# Patient Record
Sex: MALE | Race: WHITE | NOT HISPANIC OR LATINO | Employment: OTHER | URBAN - METROPOLITAN AREA
[De-identification: names, ages, dates, MRNs, and addresses within clinical notes are randomized per-mention and may not be internally consistent; named-entity substitution may affect disease eponyms.]

---

## 2020-10-23 ENCOUNTER — TRANSCRIBE ORDERS (OUTPATIENT)
Dept: ADMINISTRATIVE | Facility: HOSPITAL | Age: 75
End: 2020-10-23

## 2020-10-23 DIAGNOSIS — R07.9 CHEST PAIN, UNSPECIFIED TYPE: Primary | ICD-10-CM

## 2020-10-23 DIAGNOSIS — R00.2 PALPITATIONS: ICD-10-CM

## 2021-02-05 ENCOUNTER — HOSPITAL ENCOUNTER (OUTPATIENT)
Dept: NON INVASIVE DIAGNOSTICS | Facility: HOSPITAL | Age: 76
Discharge: HOME | End: 2021-02-05
Payer: MEDICARE

## 2021-02-05 DIAGNOSIS — R07.9 CHEST PAIN, UNSPECIFIED TYPE: ICD-10-CM

## 2021-02-05 DIAGNOSIS — R00.2 PALPITATIONS: ICD-10-CM

## 2021-02-05 PROCEDURE — 93306 TTE W/DOPPLER COMPLETE: CPT | Performed by: INTERNAL MEDICINE

## 2021-02-05 PROCEDURE — 93306 TTE W/DOPPLER COMPLETE: CPT

## 2022-03-09 ENCOUNTER — HOSPITAL ENCOUNTER (OUTPATIENT)
Dept: NUCLEAR MEDICINE | Facility: HOSPITAL | Age: 77
Discharge: HOME | End: 2022-03-09
Payer: MEDICARE

## 2022-03-09 DIAGNOSIS — E21.3 HYPERPARATHYROIDISM, UNSPECIFIED (HCC): ICD-10-CM

## 2022-03-09 PROCEDURE — 78071 PARATHYRD PLANAR W/WO SUBTRJ: CPT

## 2022-03-09 PROCEDURE — A9500 TC99M SESTAMIBI: HCPCS

## 2022-03-09 PROCEDURE — G1004 CDSM NDSC: HCPCS

## 2024-04-11 ENCOUNTER — TELEPHONE (OUTPATIENT)
Age: 79
End: 2024-04-11

## 2024-04-11 ENCOUNTER — OFFICE VISIT (OUTPATIENT)
Dept: UROLOGY | Facility: CLINIC | Age: 79
End: 2024-04-11
Payer: MEDICARE

## 2024-04-11 ENCOUNTER — TELEPHONE (OUTPATIENT)
Dept: UROLOGY | Facility: CLINIC | Age: 79
End: 2024-04-11

## 2024-04-11 VITALS
SYSTOLIC BLOOD PRESSURE: 130 MMHG | HEART RATE: 78 BPM | DIASTOLIC BLOOD PRESSURE: 66 MMHG | OXYGEN SATURATION: 98 % | WEIGHT: 230 LBS | HEIGHT: 72 IN | TEMPERATURE: 98 F | BODY MASS INDEX: 31.15 KG/M2

## 2024-04-11 DIAGNOSIS — N40.1 BENIGN PROSTATIC HYPERPLASIA WITH NOCTURIA: Primary | ICD-10-CM

## 2024-04-11 DIAGNOSIS — N52.01 ERECTILE DYSFUNCTION DUE TO ARTERIAL INSUFFICIENCY: ICD-10-CM

## 2024-04-11 DIAGNOSIS — N18.31 STAGE 3A CHRONIC KIDNEY DISEASE (HCC): ICD-10-CM

## 2024-04-11 DIAGNOSIS — R35.1 BENIGN PROSTATIC HYPERPLASIA WITH NOCTURIA: Primary | ICD-10-CM

## 2024-04-11 PROCEDURE — 81003 URINALYSIS AUTO W/O SCOPE: CPT | Performed by: UROLOGY

## 2024-04-11 PROCEDURE — 99204 OFFICE O/P NEW MOD 45 MIN: CPT | Performed by: UROLOGY

## 2024-04-11 RX ORDER — OXYCODONE HYDROCHLORIDE 5 MG/1
TABLET ORAL
COMMUNITY
Start: 2024-01-30

## 2024-04-11 RX ORDER — SILDENAFIL CITRATE 20 MG/1
TABLET ORAL
Qty: 90 TABLET | Refills: 3 | Status: SHIPPED | OUTPATIENT
Start: 2024-04-11

## 2024-04-11 NOTE — TELEPHONE ENCOUNTER
Call to Dr. Jacobsen's office. Last psa was 5/2023 and was 0.01. All records were scanned into [employer]

## 2024-04-11 NOTE — TELEPHONE ENCOUNTER
PA for REVATIO    Submitted via    [x]CMM-KEY [id number]    []Surescripts-Case ID #   []Faxed to plan   []Other website   []Phone call Case ID #     Office notes sent, clinical questions answered. Awaiting determination    Turnaround time for your insurance to make a decision on your Prior Authorization can take 7-21 business days.

## 2024-04-11 NOTE — PROGRESS NOTES
UROLOGY PROGRESS NOTE         NAME: Demarcus Nelson  AGE: 79 y.o. SEX: male  : 1945   MRN: [id number]    DATE: 2024  TIME: 1:33 PM    Assessment and Plan      Impression:   1. Benign prostatic hyperplasia with nocturia    2. Erectile dysfunction due to arterial insufficiency    Mild BPH symptoms.  Mild erectile dysfunction.  Patient is doing fairly well symptomatically.  Viagra has not helped him in the past.  Options discussed.     Plan: Check PSA.  Restart sildenafil.  Follow-up 1 year or sooner if any troubles.      Chief Complaint     Chief Complaint   Patient presents with    Establishing new urology doctor-needs yearly check up     History of Present Illness     HPI: Demarcus Nelson is a 79 y.o. year old male who presents with history of mild BPH followed over the years.  PSA last year 0.1.  He has nocturia once a night occasional urgency.  Otherwise is relatively asymptomatic from urologic standpoint.  His symptoms are not bothersome.  No hematuria no dysuria.  Previously had some erectile dysfunction that was treated with sildenafil successfully.  He would like to restart it.  Currently he is able to get an erection that goes soft prior to completing the act.  He tolerated the medicine well in the past.              The following portions of the patient's history were reviewed and updated as appropriate: allergies, current medications, past family history, past medical history, past social history, past surgical history and problem list.  Past Medical History:   Diagnosis Date    Arthritis     BPH (benign prostatic hyperplasia)     Cervical radiculopathy     CKD (chronic kidney disease), stage III (HCC)     Diverticulitis of colon     GERD (gastroesophageal reflux disease)     Heart murmur     HTN (hypertension)     Hypercalcemia     Hyperlipidemia     Hyperparathyroidism (HCC)     Hypothyroidism     Lumbar canal stenosis     PONV (postoperative nausea and vomiting)     Spinal stenosis      TIA (transient ischemic attack)     Vertebral artery stenosis      Past Surgical History:   Procedure Laterality Date    BACK SURGERY      BAND HEMORRHOIDECTOMY      CARPAL TUNNEL RELEASE      CATARACT EXTRACTION      CHOLECYSTECTOMY      COLONOSCOPY      ESOPHAGOGASTRODUODENOSCOPY      HERNIA REPAIR      LUMBAR SPINE SURGERY      PARATHYROIDECTOMY      TOTAL KNEE ARTHROPLASTY      US GUIDED THYROID BIOPSY  02/15/2023     shoulder  Review of Systems     Const: Denies chills, fever and weight loss.  CV: Denies chest pain.  Resp: Denies SOB.  GI: Denies abdominal pain, nausea and vomiting.  : Denies symptoms other than stated above.  Musculo: Denies back pain.    Objective   /66   Pulse 78   Temp 98 °F (36.7 °C)   Ht 6' (1.829 m)   Wt 104 kg (230 lb)   SpO2 98%   BMI 31.19 kg/m²     Physical Exam  Const: Appears healthy and well developed. No signs of acute distress present.  Resp: Respirations are regular and unlabored.   CV: Rate is regular. Rhythm is regular.  Abdomen: Abdomen is soft, nontender, and nondistended. Kidneys are not palpable.  : Penis testicles epididymis normal.  No hernias.  Prostate 1+ benign.  Psych: Patient's attitude is cooperative. Mood is normal. Affect is normal.    Current Medications     Current Outpatient Medications:     amLODIPine (NORVASC) 10 mg tablet, Take 10 mg by mouth daily, Disp: , Rfl:     ASCORBIC ACID CR PO, Take 1 tablet by mouth in the morning, Disp: , Rfl:     aspirin (ECOTRIN LOW STRENGTH) 81 mg EC tablet, Take 81 mg by mouth daily, Disp: , Rfl:     atorvastatin (LIPITOR) 10 mg tablet, Take 10 mg by mouth daily, Disp: , Rfl:     benazepril (LOTENSIN) 40 MG tablet, Take 40 mg by mouth daily, Disp: , Rfl:     Cholecalciferol 125 MCG (5000 UT) TABS, Take 1 tablet by mouth in the morning, Disp: , Rfl:     CYANOCOBALAMIN PO, Take 1 tablet by mouth in the morning, Disp: , Rfl:     DOCOSAHEXAENOIC ACID PO, Take 1 tablet by mouth in the morning, Disp: , Rfl:      DOCOSAHEXAENOIC ACID-EPA PO, Take 1 tablet by mouth in the morning, Disp: , Rfl:     dorzolamide-timolol (COSOPT) 2-0.5 % ophthalmic solution, Administer 1 drop to both eyes 2 (two) times a day, Disp: , Rfl:     hydroCHLOROthiazide 12.5 mg tablet, Take 12.5 mg by mouth daily, Disp: , Rfl:     indapamide (LOZOL) 2.5 mg tablet, Take 2.5 mg by mouth daily, Disp: , Rfl:     latanoprost (XALATAN) 0.005 % ophthalmic solution, Administer 1 drop to both eyes daily, Disp: , Rfl:     levothyroxine 50 mcg tablet, Take 50 mcg by mouth daily, Disp: , Rfl:     lidocaine (XYLOCAINE) 5 % ointment, Apply 1 Application topically once, Disp: , Rfl:     metoprolol succinate (TOPROL-XL) 25 mg 24 hr tablet, Take 25 mg by mouth in the morning, Disp: , Rfl:     Multiple Vitamin (MULTIVITAMIN ADULT PO), Take 1 tablet by mouth daily, Disp: , Rfl:     naproxen (NAPROSYN) 500 mg tablet, Take 500 mg by mouth 2 (two) times a day with meals, Disp: , Rfl:     NIFEdipine ER (ADALAT CC) 30 MG 24 hr tablet, Take 30 mg by mouth daily, Disp: , Rfl:     omeprazole (PriLOSEC) 40 MG capsule, Take 40 mg by mouth 2 (two) times a day, Disp: , Rfl:     Mnyjsl-VrVso-YhEqoq-FA-Omega (MULTIVITAMIN/MINERALS PO), Take 1 tablet by mouth daily, Disp: , Rfl:     gabapentin (NEURONTIN) 100 mg capsule, Take 100 mg by mouth 2 (two) times a day (Patient not taking: Reported on 4/11/2024), Disp: , Rfl:     oxyCODONE (ROXICODONE) 5 immediate release tablet, TAKE 1 TABLET (5 MG TOTAL) BY MOUTH EVERY 4 HOURS AS NEEDED FOR SEVERE PAIN FOR UP TO 7 DAYS (Patient not taking: Reported on 4/11/2024), Disp: , Rfl:     senna-docusate sodium (SENOKOT S) 8.6-50 mg per tablet, Take 2 tablets by mouth daily (Patient not taking: Reported on 4/11/2024), Disp: , Rfl:         Frank D'Amico, MD

## 2024-04-12 ENCOUNTER — PATIENT MESSAGE (OUTPATIENT)
Age: 79
End: 2024-04-12

## 2024-04-12 ENCOUNTER — APPOINTMENT (OUTPATIENT)
Dept: LAB | Facility: CLINIC | Age: 79
End: 2024-04-12
Payer: MEDICARE

## 2024-04-12 DIAGNOSIS — N52.01 ERECTILE DYSFUNCTION DUE TO ARTERIAL INSUFFICIENCY: ICD-10-CM

## 2024-04-12 DIAGNOSIS — R35.1 BENIGN PROSTATIC HYPERPLASIA WITH NOCTURIA: ICD-10-CM

## 2024-04-12 DIAGNOSIS — N40.1 BENIGN PROSTATIC HYPERPLASIA WITH NOCTURIA: ICD-10-CM

## 2024-04-12 LAB
PSA SERPL-MCNC: 0.93 NG/ML (ref 0–4)
SL AMB  POCT GLUCOSE, UA: NORMAL
SL AMB LEUKOCYTE ESTERASE,UA: NORMAL
SL AMB POCT BILIRUBIN,UA: NORMAL
SL AMB POCT BLOOD,UA: NORMAL
SL AMB POCT CLARITY,UA: CLEAR
SL AMB POCT COLOR,UA: YELLOW
SL AMB POCT KETONES,UA: NORMAL
SL AMB POCT NITRITE,UA: NORMAL
SL AMB POCT PH,UA: 5.5
SL AMB POCT SPECIFIC GRAVITY,UA: 1.01
SL AMB POCT URINE PROTEIN: NORMAL
SL AMB POCT UROBILINOGEN: 0.2

## 2024-04-12 PROCEDURE — 84153 ASSAY OF PSA TOTAL: CPT

## 2024-04-12 PROCEDURE — 36415 COLL VENOUS BLD VENIPUNCTURE: CPT

## 2025-07-09 ENCOUNTER — TELEPHONE (OUTPATIENT)
Dept: UROLOGY | Facility: CLINIC | Age: 80
End: 2025-07-09

## 2025-07-09 ENCOUNTER — OFFICE VISIT (OUTPATIENT)
Dept: UROLOGY | Facility: CLINIC | Age: 80
End: 2025-07-09
Payer: MEDICARE

## 2025-07-09 ENCOUNTER — APPOINTMENT (OUTPATIENT)
Dept: LAB | Facility: HOSPITAL | Age: 80
End: 2025-07-09
Payer: MEDICARE

## 2025-07-09 VITALS
OXYGEN SATURATION: 95 % | DIASTOLIC BLOOD PRESSURE: 84 MMHG | SYSTOLIC BLOOD PRESSURE: 144 MMHG | WEIGHT: 222 LBS | HEART RATE: 71 BPM | BODY MASS INDEX: 30.11 KG/M2 | TEMPERATURE: 98.1 F

## 2025-07-09 DIAGNOSIS — R35.1 BENIGN PROSTATIC HYPERPLASIA WITH NOCTURIA: ICD-10-CM

## 2025-07-09 DIAGNOSIS — N52.01 ERECTILE DYSFUNCTION DUE TO ARTERIAL INSUFFICIENCY: ICD-10-CM

## 2025-07-09 DIAGNOSIS — N40.1 BENIGN PROSTATIC HYPERPLASIA WITH NOCTURIA: ICD-10-CM

## 2025-07-09 DIAGNOSIS — R35.1 BENIGN PROSTATIC HYPERPLASIA WITH NOCTURIA: Primary | ICD-10-CM

## 2025-07-09 DIAGNOSIS — N40.1 BENIGN PROSTATIC HYPERPLASIA WITH NOCTURIA: Primary | ICD-10-CM

## 2025-07-09 LAB
PSA SERPL-MCNC: 0.99 NG/ML (ref 0–4)
SL AMB  POCT GLUCOSE, UA: NORMAL
SL AMB LEUKOCYTE ESTERASE,UA: NORMAL
SL AMB POCT BILIRUBIN,UA: NORMAL
SL AMB POCT BLOOD,UA: NORMAL
SL AMB POCT CLARITY,UA: CLEAR
SL AMB POCT COLOR,UA: YELLOW
SL AMB POCT KETONES,UA: NORMAL
SL AMB POCT NITRITE,UA: NORMAL
SL AMB POCT PH,UA: 6
SL AMB POCT SPECIFIC GRAVITY,UA: 1.01
SL AMB POCT URINE PROTEIN: NORMAL
SL AMB POCT UROBILINOGEN: 0.2

## 2025-07-09 PROCEDURE — 84153 ASSAY OF PSA TOTAL: CPT

## 2025-07-09 PROCEDURE — 81003 URINALYSIS AUTO W/O SCOPE: CPT | Performed by: UROLOGY

## 2025-07-09 PROCEDURE — 36415 COLL VENOUS BLD VENIPUNCTURE: CPT

## 2025-07-09 PROCEDURE — 99214 OFFICE O/P EST MOD 30 MIN: CPT | Performed by: UROLOGY

## 2025-07-09 RX ORDER — TADALAFIL 20 MG/1
20 TABLET ORAL DAILY PRN
Qty: 18 TABLET | Refills: 3 | Status: SHIPPED | OUTPATIENT
Start: 2025-07-09

## 2025-07-09 RX ORDER — SILDENAFIL 100 MG/1
100 TABLET, FILM COATED ORAL DAILY PRN
Qty: 18 TABLET | Refills: 3 | Status: SHIPPED | OUTPATIENT
Start: 2025-07-09

## 2025-07-09 RX ORDER — FAMOTIDINE 20 MG/1
TABLET, FILM COATED ORAL
COMMUNITY
Start: 2025-03-27

## 2025-07-09 NOTE — PROGRESS NOTES
UROLOGY PROGRESS NOTE         NAME: Demarcus Nelson  AGE: 80 y.o. SEX: male  : 1945   MRN: [id number]    DATE: 2025  TIME: 1:26 PM    Assessment and Plan      Impression:   1. Benign prostatic hyperplasia with nocturia  2. Erectile dysfunction due to arterial insufficiency  Patient would like higher dose of medication.  We talked about Cialis and Viagra.  I gave him prescription for both.  He knows he cannot take them at the same time.     Plan: Will get a PSA will see him back in a year      Chief Complaint     Chief Complaint   Patient presents with    Follow-up     History of Present Illness     HPI: Demarcus Nelson is a 80 y.o. year old male who presents with mild BPH has nocturia couple times a night no urgency or frequency no incontinence no hematuria no dysuria.  He does have a good libido.  The sildenafil is not working as well for him but it sounds like he only took 60 mg.  We talked about trying Cialis or higher dose of Viagra.  He is willing to try that.  Will give him a prescription for both he knows he can take them both at the same time.  We talked about how to dose them.  PSA last year less than 1.              The following portions of the patient's history were reviewed and updated as appropriate: allergies, current medications, past family history, past medical history, past social history, past surgical history and problem list.  Past Medical History[1]  Past Surgical History[2]  shoulder  Review of Systems     Const: Denies chills, fever and weight loss.  CV: Denies chest pain.  Resp: Denies SOB.  GI: Denies abdominal pain, nausea and vomiting.  : Denies symptoms other than stated above.  Musculo: Denies back pain.    Objective   /84   Pulse 71   Temp 98.1 °F (36.7 °C)   Wt 101 kg (222 lb)   SpO2 95%   BMI 30.11 kg/m²     Physical Exam  Const: Appears healthy and well developed. No signs of acute distress present.  Resp: Respirations are regular and unlabored.    CV: Rate is regular. Rhythm is regular.  Abdomen: Abdomen is soft, nontender, and nondistended. Kidneys are not palpable.  : Penis testicles epididymis normal no hernias.  Prostate 2+ benign.  Psych: Patient's attitude is cooperative. Mood is normal. Affect is normal.    Current Medications   Current Medications[3]        Frank D'Amico, MD             [1]   Past Medical History:  Diagnosis Date    Arthritis     Driscoll esophagus     BPH (benign prostatic hyperplasia)     with outflow obstruction    Cervical radiculopathy     CKD (chronic kidney disease), stage III (HCC)     Diverticulitis of colon     Dysphagia     GERD (gastroesophageal reflux disease)     Heart murmur     Hemorrhoids     HTN (hypertension)     Hypercalcemia     Hyperlipidemia     Hyperparathyroidism (HCC)     Hypothyroidism     Lumbar canal stenosis     PONV (postoperative nausea and vomiting)     Spinal stenosis     TIA (transient ischemic attack)     Vertebral artery stenosis     Vitamin D deficiency    [2]   Past Surgical History:  Procedure Laterality Date    BACK SURGERY      BAND HEMORRHOIDECTOMY      CARPAL TUNNEL RELEASE      CATARACT EXTRACTION      CHOLECYSTECTOMY      COLONOSCOPY      ESOPHAGOGASTRODUODENOSCOPY      HERNIA REPAIR      LUMBAR SPINE SURGERY      PARATHYROIDECTOMY      TOTAL KNEE ARTHROPLASTY      US GUIDED THYROID BIOPSY  02/15/2023   [3]   Current Outpatient Medications:     amLODIPine (NORVASC) 10 mg tablet, Take 10 mg by mouth in the morning., Disp: , Rfl:     ASCORBIC ACID CR PO, Take 1 tablet by mouth in the morning, Disp: , Rfl:     aspirin (ECOTRIN LOW STRENGTH) 81 mg EC tablet, Take 81 mg by mouth daily (Patient taking differently: Take 81 mg by mouth in the morning. Every other day.), Disp: , Rfl:     atorvastatin (LIPITOR) 10 mg tablet, Take 10 mg by mouth daily (Patient taking differently: Take 10 mg by mouth in the morning. Every other day.), Disp: , Rfl:     benazepril (LOTENSIN) 40 MG tablet, Take 40 mg  by mouth in the morning., Disp: , Rfl:     Cholecalciferol 125 MCG (5000 UT) TABS, Take 1 tablet by mouth in the morning, Disp: , Rfl:     dorzolamide-timolol (COSOPT) 2-0.5 % ophthalmic solution, Administer 1 drop to both eyes in the morning and 1 drop in the evening., Disp: , Rfl:     famotidine (PEPCID) 20 mg tablet, Take by mouth, Disp: , Rfl:     hydroCHLOROthiazide 12.5 mg tablet, Take 12.5 mg by mouth in the morning., Disp: , Rfl:     latanoprost (XALATAN) 0.005 % ophthalmic solution, Administer 1 drop to both eyes in the morning., Disp: , Rfl:     levothyroxine 50 mcg tablet, Take 50 mcg by mouth in the morning., Disp: , Rfl:     metoprolol succinate (TOPROL-XL) 25 mg 24 hr tablet, Take 25 mg by mouth in the morning, Disp: , Rfl:     Multiple Vitamin (MULTIVITAMIN ADULT PO), Take 1 tablet by mouth in the morning., Disp: , Rfl:     naproxen (NAPROSYN) 500 mg tablet, Take 500 mg by mouth 2 (two) times a day with meals, Disp: , Rfl:     CYANOCOBALAMIN PO, Take 1 tablet by mouth in the morning (Patient not taking: Reported on 7/9/2025), Disp: , Rfl:     DOCOSAHEXAENOIC ACID PO, Take 1 tablet by mouth in the morning (Patient not taking: Reported on 7/9/2025), Disp: , Rfl:     DOCOSAHEXAENOIC ACID-EPA PO, Take 1 tablet by mouth in the morning (Patient not taking: Reported on 7/9/2025), Disp: , Rfl:     gabapentin (NEURONTIN) 100 mg capsule, Take 100 mg by mouth 2 (two) times a day (Patient not taking: Reported on 7/9/2025), Disp: , Rfl:     indapamide (LOZOL) 2.5 mg tablet, Take 2.5 mg by mouth daily (Patient not taking: Reported on 7/9/2025), Disp: , Rfl:     lidocaine (XYLOCAINE) 5 % ointment, Apply 1 Application topically once (Patient not taking: Reported on 7/9/2025), Disp: , Rfl:     NIFEdipine ER (ADALAT CC) 30 MG 24 hr tablet, Take 30 mg by mouth daily (Patient not taking: Reported on 7/9/2025), Disp: , Rfl:     omeprazole (PriLOSEC) 40 MG capsule, Take 40 mg by mouth 2 (two) times a day (Patient not  taking: Reported on 7/9/2025), Disp: , Rfl:     oxyCODONE (ROXICODONE) 5 immediate release tablet, TAKE 1 TABLET (5 MG TOTAL) BY MOUTH EVERY 4 HOURS AS NEEDED FOR SEVERE PAIN FOR UP TO 7 DAYS (Patient not taking: Reported on 7/9/2025), Disp: , Rfl:     [place]-VxKan-XqEgag-FA-Omega (MULTIVITAMIN/MINERALS PO), Take 1 tablet by mouth daily (Patient not taking: Reported on 7/9/2025), Disp: , Rfl:     senna-docusate sodium (SENOKOT S) 8.6-50 mg per tablet, Take 2 tablets by mouth daily (Patient not taking: Reported on 4/11/2024), Disp: , Rfl:     sildenafil (REVATIO) 20 mg tablet, Take anywhere from 1 to 5 tablets but no more than 5 tablets in a 24-hour period.  Do not drink alcohol with this product and take on empty stomach or wait 2 hours after eating to take the medication. (Patient not taking: Reported on 7/9/2025), Disp: 90 tablet, Rfl: 3